# Patient Record
Sex: FEMALE | Race: BLACK OR AFRICAN AMERICAN | Employment: UNEMPLOYED | URBAN - METROPOLITAN AREA
[De-identification: names, ages, dates, MRNs, and addresses within clinical notes are randomized per-mention and may not be internally consistent; named-entity substitution may affect disease eponyms.]

---

## 2020-05-15 ENCOUNTER — HOSPITAL ENCOUNTER (EMERGENCY)
Facility: HOSPITAL | Age: 45
Discharge: HOME | End: 2020-05-15
Attending: EMERGENCY MEDICINE
Payer: MEDICAID

## 2020-05-15 VITALS
RESPIRATION RATE: 24 BRPM | WEIGHT: 265 LBS | OXYGEN SATURATION: 97 % | DIASTOLIC BLOOD PRESSURE: 75 MMHG | HEART RATE: 97 BPM | TEMPERATURE: 100 F | SYSTOLIC BLOOD PRESSURE: 143 MMHG

## 2020-05-15 DIAGNOSIS — U07.1 COVID-19 VIRUS INFECTION: Primary | ICD-10-CM

## 2020-05-15 PROCEDURE — 99282 EMERGENCY DEPT VISIT SF MDM: CPT

## 2020-05-15 RX ORDER — IBUPROFEN 600 MG/1
600 TABLET ORAL ONCE
Status: COMPLETED | OUTPATIENT
Start: 2020-05-15 | End: 2020-05-15

## 2020-05-16 NOTE — ED INITIAL ASSESSMENT (HPI)
Tested for covid yesterday. Called just now with positive result. Continues to have fever and headache. Cough yesterday. Tylenol last this morning.  No motrin

## 2020-05-16 NOTE — ED PROVIDER NOTES
Patient Seen in: [facility] AND [facility] Emergency Department    History   Patient presents with:  Fever      HPI    Patient presents to the ED complaining of ongoing fever, headache, cough and body aches after being diagnosed with COVID 19 yesterday.   Symptom Morbidly obese   HENT:   Head: Normocephalic and atraumatic. Cardiovascular: Normal rate, regular rhythm and intact distal pulses. Pulmonary/Chest: Effort normal and breath sounds normal. No respiratory distress. Abdominal: Soft.  She exhibits no di soon as possible for a visit in 3 days      [facility] Emergency Department  [facility] [address] [place] [address]  [phone]    If symptoms worsen      Medications Prescribed:  There are no discharge medications for this patient.

## 2020-05-20 ENCOUNTER — HOSPITAL ENCOUNTER (INPATIENT)
Facility: HOSPITAL | Age: 45
LOS: 3 days | Discharge: HOME | DRG: 177 | End: 2020-05-24
Attending: EMERGENCY MEDICINE | Admitting: INTERNAL MEDICINE
Payer: MEDICAID

## 2020-05-20 ENCOUNTER — APPOINTMENT (OUTPATIENT)
Dept: GENERAL RADIOLOGY | Facility: HOSPITAL | Age: 45
DRG: 177 | End: 2020-05-20
Attending: EMERGENCY MEDICINE
Payer: MEDICAID

## 2020-05-20 DIAGNOSIS — J12.82 PNEUMONIA DUE TO COVID-19 VIRUS: ICD-10-CM

## 2020-05-20 DIAGNOSIS — U07.1 PNEUMONIA DUE TO COVID-19 VIRUS: ICD-10-CM

## 2020-05-20 DIAGNOSIS — R06.89 RESPIRATORY INSUFFICIENCY: Primary | ICD-10-CM

## 2020-05-20 PROCEDURE — 71045 X-RAY EXAM CHEST 1 VIEW: CPT | Performed by: EMERGENCY MEDICINE

## 2020-05-21 PROBLEM — U07.1 PNEUMONIA DUE TO COVID-19 VIRUS: Status: ACTIVE | Noted: 2020-05-21

## 2020-05-21 PROBLEM — J12.82 PNEUMONIA DUE TO COVID-19 VIRUS: Status: ACTIVE | Noted: 2020-05-21

## 2020-05-21 PROBLEM — R06.89 RESPIRATORY INSUFFICIENCY: Status: ACTIVE | Noted: 2020-05-21

## 2020-05-21 RX ORDER — ACETAMINOPHEN 500 MG
1000 TABLET ORAL EVERY 4 HOURS PRN
Status: DISCONTINUED | OUTPATIENT
Start: 2020-05-21 | End: 2020-05-24

## 2020-05-21 RX ORDER — ENOXAPARIN SODIUM 100 MG/ML
40 INJECTION SUBCUTANEOUS DAILY
Status: DISCONTINUED | OUTPATIENT
Start: 2020-05-21 | End: 2020-05-21

## 2020-05-21 RX ORDER — SODIUM CHLORIDE 9 MG/ML
INJECTION, SOLUTION INTRAVENOUS ONCE
Status: DISCONTINUED | OUTPATIENT
Start: 2020-05-21 | End: 2020-05-24

## 2020-05-21 RX ORDER — ENOXAPARIN SODIUM 100 MG/ML
30 INJECTION SUBCUTANEOUS EVERY 12 HOURS SCHEDULED
Status: DISCONTINUED | OUTPATIENT
Start: 2020-05-21 | End: 2020-05-24

## 2020-05-21 RX ORDER — ACETAMINOPHEN 500 MG
500 TABLET ORAL EVERY 4 HOURS PRN
Status: DISCONTINUED | OUTPATIENT
Start: 2020-05-21 | End: 2020-05-24

## 2020-05-21 RX ORDER — ONDANSETRON 2 MG/ML
4 INJECTION INTRAMUSCULAR; INTRAVENOUS EVERY 6 HOURS PRN
Status: DISCONTINUED | OUTPATIENT
Start: 2020-05-21 | End: 2020-05-24

## 2020-05-21 NOTE — CM/SW NOTE
Pt's insurance OON - [employer].   Pt being admitted INPT for:   Respiratory insufficiency    Pneumonia due to COVID-19 virus

## 2020-05-21 NOTE — PLAN OF CARE
Problem: Patient Centered Care  Goal: Patient preferences are identified and integrated in the patient's plan of care  Description  Interventions:  - What would you like us to know as we care for you?  From home  - Provide timely, complete, and accurate i SAFETY ADULT - FALL  Goal: Free from fall injury  Description  INTERVENTIONS:  - Assess pt frequently for physical needs  - Identify cognitive and physical deficits and behaviors that affect risk of falls.   - [place] fall precautions as indicated by asse

## 2020-05-21 NOTE — CONSULTS
Pulmonary Consult     Assessment / Plan:  1. Acute hypoxic respiratory failure - due to COVID  - wean supplemental O2 as able  - self prone  2.  COVID-19 pneumonia - dx 5/14; rapid here was negative  - hold on starting plaquenil given unproven efficacy and imaging reviewed

## 2020-05-21 NOTE — PLAN OF CARE
Problem: Patient Centered Care  Goal: Patient preferences are identified and integrated in the patient's plan of care  Description  Interventions:  - What would you like us to know as we care for you?   - Provide timely, complete, and accurate informatio Problem: SKIN/TISSUE INTEGRITY - ADULT  Goal: Skin integrity remains intact  Description  INTERVENTIONS  - Assess and document risk factors for pressure ulcer development  - Assess and document skin integrity  - Monitor for areas of redness and/or skin b

## 2020-05-21 NOTE — ED NOTES
Pt a&o x4, independent. Pt w/exertional dyspnea. Dx COVID x5 days ago. Worsening sob. NAD. 2L NC d/t O2 desat w/activity.  Call [facility] @[phone] w/any questions

## 2020-05-21 NOTE — ED PROVIDER NOTES
Patient Seen in: [facility] AND [facility] Emergency Department    History   Patient presents with:  Dyspnea XU SOB    Stated Complaint:  Cough/dyspnea    HPI    81-year-old female with past medical history of asthma presenting for evaluation of approximately 24 injection. Neck: No meningismus. Cardiovascular: RRR. Pulmonary/Chest: Tachypneic; exertionally dyspneic with bibasilar rhonchi. Abdominal: Soft. Nontender. Musculoskeletal: No gross deformity. Neurological: Alert. Skin: Skin is warm.    Psychiatr DATE OF EXAM: 05/20/2020  Patient No:  [id number]  Physician:  Karla Leyden  YOB: 1975    Past Medical History (entered by Technologist):  History of asthma. Reason For Exam (entered by Technologist):   Worsening dyspnea and cough rafaela White in agreement with plan of care.     I was wearing at minimum a facemask and eye protection throughout this encounter with handwashing performed prior and after patient evaluation without personal hand/facial/oropharyngeal contact and maxx

## 2020-05-21 NOTE — H&P
[facility]    History and Physical    Jules Concepcion Patient Status:  Inpatient    1975 MRN [id number]   Location [facility] Attending Awa Alvarez MD   Hosp Day # 0 PCP Kate Gonzalez MD     Date:  2020  Date Hematological: Negative. Psychiatric/Behavioral: Negative. Negative for behavioral problems, confusion and agitation. Physical Exam:   Vital Signs:  Blood pressure 125/57, pulse 101, temperature 99.6 °F (37.6 °C), temperature source Oral, resp.

## 2020-05-21 NOTE — PROGRESS NOTES
[facility]:  Anticoagulation Weight Dose Adjustment for enoxaparin (LOVENOX)    Vincent Fernando is a 45year old female who has been prescribed enoxaparin (LOVENOX) 40 mg every 24 hours.       Estimated Creatinine Clearance: 62.7 mL/min (based on SCr

## 2020-05-21 NOTE — PAYOR COMM NOTE
--------------  ADMISSION REVIEW     Payor: Tom Free #:  [id number]  Authorization Number: [id number]    Admit date: 5/21/20  Admit time: 1924       Admitting Physician: Abril Wiley MD  Attending Physician:  Abril Wiley MD  Primary Care P abdominal pain. Positive for stated complaint:   Other systems are as noted in HPI. Constitutional and vital signs reviewed. All other systems reviewed and negative except as noted above.     PSFH elements reviewed from today and agreed except as Abnormality         Status                     ---------                               -----------         ------                     CBC W/ DIFFERENTIAL[[id number]]          Abnormal            Final result                 Please vi Last 1 Encounters:  05/20/20 : 100  , sinus,      Evaluation generalized myalgias/fatigue and malaise over past 5 days now complicated by constant shortness of breath and obese patient with known history of asthma; patient with profound conversational dysp Subcutaneous (Left Lower Abdomen) Wendi Clark RN

## 2020-05-21 NOTE — CONSULTS
[facility] - [facility] ID CONSULT NOTE    Aracelis Li Patient Status:  Inpatient    1975 MRN [id number]   Location [facility] Attending Carlos Trevino MD   Hosp Day # 0 PCP Bianca Baer MD       Reason for Consultat nausea, vomiting or diarrhea. No abdominal pain or blood. GENITOURINARY:  No Burning on urination. NEUROLOGICAL:  No headache, dizziness, syncope, paralysis, ataxia, numbness or tingling in the extremities.   MUSCULOSKELETAL:  No muscle, back pain, joint n/a    # Asthma    PLAN:  -  Currently on azithromycin - discontinue  -  Consented for CCP - agreeable. Ordered. -  Monitor respiratory status closely. -  Evaluate for need for actemra/steroids.  -  Follow fever curve, wbc.   -  Reviewed labs, micro, imag

## 2020-05-22 PROCEDURE — 30233R1 TRANSFUSION OF NONAUTOLOGOUS PLATELETS INTO PERIPHERAL VEIN, PERCUTANEOUS APPROACH: ICD-10-PCS | Performed by: INTERNAL MEDICINE

## 2020-05-22 NOTE — PROGRESS NOTES
[facility]    Progress Note    Aracelis Li Patient Status:  Inpatient    1975 MRN [id number]   Location [facility]5W Attending Carlos Trevino MD   Hosp Day # 1 PCP Bianca Baer MD        Subjective:     Constitut 05/22/2020     05/22/2020    K 3.9 05/22/2020     05/22/2020    CO2 25.0 05/22/2020     (H) 05/22/2020    CA 8.7 05/22/2020    ALB 3.3 (L) 05/21/2020    ALKPHO 62 05/21/2020    BILT 0.4 05/21/2020    TP 7.5 05/21/2020    AST 23 05/21/202

## 2020-05-22 NOTE — PAYOR COMM NOTE
--------------  CONTINUED STAY REVIEW   5-22-20        Payor: Galdino Nguyen #:  [id number]  Authorization Number: [id number]    Admit date: 5/21/20  Admit time: 5546    Admitting Physician: Rhonda Wood MD  Attending Physician:  Rhonda Wood MD Potassium  3.5 - 5.1 mmol/L 3.9    Chloride  98 - 112 mmol/L 105    CO2  21.0 - 32.0 mmol/L 25.0    Anion Gap  0 - 18 mmol/L 7    BUN  7 - 18 mg/dL 12    Creatinine  0.55 - 1.02 mg/dL 1. 09High       PER ID:   PLAN:  -  Currently on azithromycin - discont

## 2020-05-22 NOTE — PROGRESS NOTES
Pulmonary Progress Note     Assessment / Plan:  1. Acute hypoxic respiratory failure - due to COVID  - wean supplemental O2 as able; on 2 LPM  - self prone  2. COVID-19 pneumonia - dx 5/14; rapid here was negative  - CCP ordered.  Patient understands this i

## 2020-05-22 NOTE — DIETARY NOTE
ADULT NUTRITION INITIAL ASSESSMENT    Pt is at moderate nutrition risk. Pt does not meet malnutrition criteria.       RECOMMENDATIONS TO MD:  See Nutrition Intervention     NUTRITION DIAGNOSIS/PROBLEM:  Inadequate protein energy intake related to decreased Readings from Last 10 Encounters:  05/20/20 : 120 kg (264 lb 8.8 oz)  05/15/20 : 120.2 kg (265 lb)    GASTROINTESTINAL: +BM 5/21 and diarrhea    FOOD/NUTRITION RELATED HISTORY:  Appetite: Fair  Intake: 50-80%  Intake Meeting Needs: No, but supplements to m

## 2020-05-22 NOTE — PLAN OF CARE
Pt alert x4, on 2L oxygen, up in chair by self, appetite improved. Pt received  Convalesant plasma 1 unit given to pt this afternoon, tolerated well. Will continue to monitor. Slight temp this afternoon, MD aware.        Problem: Patient Centered Care  Goal and instruct to report SOB or any respiratory difficulty  - Respiratory Therapy support as indicated  - Manage/alleviate anxiety  - Monitor for signs/symptoms of CO2 retention  Outcome: Progressing     Problem: SAFETY ADULT - FALL  Goal: Free from fall inj

## 2020-05-23 PROCEDURE — 99232 SBSQ HOSP IP/OBS MODERATE 35: CPT | Performed by: INTERNAL MEDICINE

## 2020-05-23 NOTE — PROGRESS NOTES
[facility] PULMONARY/CRITICAL CARE    S: Pt is doing ok at this time. + cough.  Not too much dyspnea     Meds:  • enoxaparin  30 mg Subcutaneous 2 times per day   • sodium chloride   Intravenous Once       Prn Meds:  acetaminophen **OR** acetaminophen, ondansetron

## 2020-05-23 NOTE — PROGRESS NOTES
INFECTIOUS DISEASE PROGRESS NOTE    Michelle Stokes Patient Status:  Inpatient    1975 MRN [id number]   Location [facility]W Attending Rene Mckeon MD   Hosp Day # 1 PCP Lencho Norman MD     Subjective:  ROS done.  Patient not seen o and volume: 5/22 210 mL               - O2 requirement at CCP date: 2 L/min               - Type and Screen: A+               - Remdesivir start date: n/a               - Actemra date: n/a               - steroids start date: n/a    # Asthma     PLAN:    -

## 2020-05-23 NOTE — PLAN OF CARE
Problem: Patient Centered Care  Goal: Patient preferences are identified and integrated in the patient's plan of care  Description  Interventions:  - What would you like us to know as we care for you?  From home  - Provide timely, complete, and accurate i SAFETY ADULT - FALL  Goal: Free from fall injury  Description  INTERVENTIONS:  - Assess pt frequently for physical needs  - Identify cognitive and physical deficits and behaviors that affect risk of falls.   - [place] fall precautions as indicated by asse

## 2020-05-24 VITALS
WEIGHT: 264.56 LBS | RESPIRATION RATE: 18 BRPM | SYSTOLIC BLOOD PRESSURE: 104 MMHG | HEIGHT: 64 IN | TEMPERATURE: 98 F | HEART RATE: 86 BPM | DIASTOLIC BLOOD PRESSURE: 63 MMHG | BODY MASS INDEX: 45.17 KG/M2 | OXYGEN SATURATION: 95 %

## 2020-05-24 PROCEDURE — 99239 HOSP IP/OBS DSCHRG MGMT >30: CPT | Performed by: INTERNAL MEDICINE

## 2020-05-24 NOTE — PROGRESS NOTES
[facility]    Progress Note    Yury Meza Patient Status:  Inpatient    1975 MRN [id number]   Location [facility]W Attending Destiney Manning MD   Hosp Day # 2 PCP Jacklyn Wise MD        Subjective:   Subjective: 17.00 (H) 05/23/2020                        Rochelle Mccoy MD  5/23/2020

## 2020-05-24 NOTE — PLAN OF CARE
Problem: Patient Centered Care  Goal: Patient preferences are identified and integrated in the patient's plan of care  Description  Interventions:  - What would you like us to know as we care for you?  From home  - Provide timely, complete, and accurate i SAFETY ADULT - FALL  Goal: Free from fall injury  Description  INTERVENTIONS:  - Assess pt frequently for physical needs  - Identify cognitive and physical deficits and behaviors that affect risk of falls.   - [place] fall precautions as indicated by asse

## 2020-05-24 NOTE — PLAN OF CARE
Patient O2 weaned. 93% at rest, 92% while ambulating. No complaints of dyspnea or SOB. Patient stable. No home O2 requirements needed at this time. Plan for DC today.

## 2020-05-24 NOTE — PLAN OF CARE
Problem: Patient Centered Care  Goal: Patient preferences are identified and integrated in the patient's plan of care  Description  Interventions:  - Provide timely, complete, and accurate information to patient/family  - Incorporate patient and family k injury  Description  INTERVENTIONS:  - Assess pt frequently for physical needs  - Identify cognitive and physical deficits and behaviors that affect risk of falls.   - [place] fall precautions as indicated by assessment.  - Educate pt/family on patient sa

## 2020-05-24 NOTE — PLAN OF CARE
Patient prepared for dc home. Saline lock removed. Patient educated on when to follow up with MD and to return if symptoms worsen. Patient expressed a verbal understanding of all dc instructions.

## 2020-05-24 NOTE — DISCHARGE SUMMARY
[facility]    Discharge Summary    Daisha Setting Patient Status:  Inpatient    1975 MRN [id number]   Location [facility]W Attending Rosalind Dumont MD   Hosp Day # 3 PCP Zaida Hernandez MD     Date of Admission: 20    Plan  DC home    Complications: None    Consultants     Provider Role Specialty    Rosie Fontana MD Consulting Physician  INFECTIOUS DISEASES    Padmini Pittman MD Consulting Physician  PULMONARY DISEASES    Boyd Garces MD Consulting Phys

## 2020-05-24 NOTE — PROGRESS NOTES
[facility] PULMONARY/CRITICAL CARE    S: Pt is feeling ok. Denies shortness of breath. + Cough, + chest pain at times.     Meds:  • enoxaparin  30 mg Subcutaneous 2 times per day   • sodium chloride   Intravenous Once       Prn Meds:  acetaminophen **OR** acetamin lovenox ppx  2. PPx  - lovenox  3. Dispo   - ok for home today from a pulmonary standpoint; if needed we can arrange home O2  - pt can f/u via telehealth visit in 1 week. Epifanio Delgado M.D.   Pulmonary/Critical Care

## 2020-05-26 ENCOUNTER — PATIENT OUTREACH (OUTPATIENT)
Dept: CASE MANAGEMENT | Age: 45
End: 2020-05-26

## 2020-05-26 NOTE — PROGRESS NOTES
1st attempt PCP apt request    . [address] [place] [address]  [address] [facility]. [phone]

## 2020-06-03 NOTE — PAYOR COMM NOTE
--------------  DISCHARGE REVIEW    Payor: Zahira Archuleta #:  [id number]  Authorization Number: [id number]    Admit date: 5/21/20  Admit time:  0797  Discharge Date: 5/24/2020  1:17 PM     Admitting Physician: Guzman Valencia MD  Attending Physician: oriented x 3. HEENT: Moist mucous membranes. EOM-I  Neck: Supple No JVD. No carotid bruits. Respiratory: Clear to auscultation bilaterally. No wheezes. No rhonchi. Cardiovascular: S1, S2.  Regular rate and rhythm. No murmurs.  Equal pulses   Abdomen: S

## 2021-12-29 ENCOUNTER — HOSPITAL ENCOUNTER (EMERGENCY)
Facility: HOSPITAL | Age: 46
Discharge: HOME | End: 2021-12-29
Attending: EMERGENCY MEDICINE
Payer: MEDICAID

## 2021-12-29 VITALS
HEART RATE: 92 BPM | DIASTOLIC BLOOD PRESSURE: 91 MMHG | BODY MASS INDEX: 44.39 KG/M2 | SYSTOLIC BLOOD PRESSURE: 144 MMHG | HEIGHT: 64 IN | TEMPERATURE: 99 F | OXYGEN SATURATION: 98 % | WEIGHT: 260 LBS | RESPIRATION RATE: 16 BRPM

## 2021-12-29 DIAGNOSIS — R11.0 NAUSEA: ICD-10-CM

## 2021-12-29 DIAGNOSIS — Z20.822 SUSPECTED 2019 NOVEL CORONAVIRUS INFECTION: Primary | ICD-10-CM

## 2021-12-29 LAB
ALBUMIN SERPL-MCNC: 4 G/DL (ref 3.4–5)
ALBUMIN/GLOB SERPL: 1 {RATIO} (ref 1–2)
ALP LIVER SERPL-CCNC: 85 U/L
ALT SERPL-CCNC: 31 U/L
ANION GAP SERPL CALC-SCNC: 6 MMOL/L (ref 0–18)
AST SERPL-CCNC: 12 U/L (ref 15–37)
B-HCG UR QL: NEGATIVE
BASOPHILS # BLD AUTO: 0.01 X10(3) UL (ref 0–0.2)
BASOPHILS NFR BLD AUTO: 0.1 %
BILIRUB SERPL-MCNC: 0.4 MG/DL (ref 0.1–2)
BILIRUB UR QL: NEGATIVE
BUN BLD-MCNC: 16 MG/DL (ref 7–18)
BUN/CREAT SERPL: 13.7 (ref 10–20)
CALCIUM BLD-MCNC: 9.1 MG/DL (ref 8.5–10.1)
CHLORIDE SERPL-SCNC: 107 MMOL/L (ref 98–112)
CO2 SERPL-SCNC: 28 MMOL/L (ref 21–32)
COLOR UR: YELLOW
CREAT BLD-MCNC: 1.17 MG/DL
DEPRECATED RDW RBC AUTO: 47.6 FL (ref 35.1–46.3)
EOSINOPHIL # BLD AUTO: 0.04 X10(3) UL (ref 0–0.7)
EOSINOPHIL NFR BLD AUTO: 0.5 %
ERYTHROCYTE [DISTWIDTH] IN BLOOD BY AUTOMATED COUNT: 14.5 % (ref 11–15)
GLOBULIN PLAS-MCNC: 4.2 G/DL (ref 2.8–4.4)
GLUCOSE BLD-MCNC: 107 MG/DL (ref 70–99)
GLUCOSE UR-MCNC: NEGATIVE MG/DL
HCT VFR BLD AUTO: 44 %
HGB BLD-MCNC: 14.1 G/DL
HGB UR QL STRIP.AUTO: NEGATIVE
IMM GRANULOCYTES # BLD AUTO: 0.02 X10(3) UL (ref 0–1)
IMM GRANULOCYTES NFR BLD: 0.2 %
KETONES UR-MCNC: NEGATIVE MG/DL
LEUKOCYTE ESTERASE UR QL STRIP.AUTO: NEGATIVE
LYMPHOCYTES # BLD AUTO: 0.56 X10(3) UL (ref 1–4)
LYMPHOCYTES NFR BLD AUTO: 6.4 %
MCH RBC QN AUTO: 28.8 PG (ref 26–34)
MCHC RBC AUTO-ENTMCNC: 32 G/DL (ref 31–37)
MCV RBC AUTO: 90 FL
MONOCYTES # BLD AUTO: 0.51 X10(3) UL (ref 0.1–1)
MONOCYTES NFR BLD AUTO: 5.8 %
NEUTROPHILS # BLD AUTO: 7.62 X10 (3) UL (ref 1.5–7.7)
NEUTROPHILS # BLD AUTO: 7.62 X10(3) UL (ref 1.5–7.7)
NEUTROPHILS NFR BLD AUTO: 87 %
NITRITE UR QL STRIP.AUTO: NEGATIVE
OSMOLALITY SERPL CALC.SUM OF ELEC: 294 MOSM/KG (ref 275–295)
PH UR: 8 [PH] (ref 5–8)
PLATELET # BLD AUTO: 327 10(3)UL (ref 150–450)
POTASSIUM SERPL-SCNC: 4.2 MMOL/L (ref 3.5–5.1)
PROT SERPL-MCNC: 8.2 G/DL (ref 6.4–8.2)
PROT UR-MCNC: 30 MG/DL
RBC # BLD AUTO: 4.89 X10(6)UL
SODIUM SERPL-SCNC: 141 MMOL/L (ref 136–145)
SP GR UR STRIP: 1.02 (ref 1–1.03)
UROBILINOGEN UR STRIP-ACNC: <2
WBC # BLD AUTO: 8.8 X10(3) UL (ref 4–11)

## 2021-12-29 PROCEDURE — 96361 HYDRATE IV INFUSION ADD-ON: CPT

## 2021-12-29 PROCEDURE — 81025 URINE PREGNANCY TEST: CPT

## 2021-12-29 PROCEDURE — 99284 EMERGENCY DEPT VISIT MOD MDM: CPT

## 2021-12-29 PROCEDURE — 81001 URINALYSIS AUTO W/SCOPE: CPT | Performed by: EMERGENCY MEDICINE

## 2021-12-29 PROCEDURE — 85025 COMPLETE CBC W/AUTO DIFF WBC: CPT | Performed by: EMERGENCY MEDICINE

## 2021-12-29 PROCEDURE — 80053 COMPREHEN METABOLIC PANEL: CPT | Performed by: EMERGENCY MEDICINE

## 2021-12-29 PROCEDURE — 96374 THER/PROPH/DIAG INJ IV PUSH: CPT

## 2021-12-29 RX ORDER — ONDANSETRON 4 MG/1
4 TABLET, ORALLY DISINTEGRATING ORAL EVERY 4 HOURS PRN
Qty: 10 TABLET | Refills: 0 | Status: SHIPPED | OUTPATIENT
Start: 2021-12-29 | End: 2022-01-05

## 2021-12-29 RX ORDER — ONDANSETRON 2 MG/ML
4 INJECTION INTRAMUSCULAR; INTRAVENOUS ONCE
Status: COMPLETED | OUTPATIENT
Start: 2021-12-29 | End: 2021-12-29

## 2021-12-29 NOTE — ED PROVIDER NOTES
Patient Seen in: [facility] AND [facility] Emergency Department      History   Patient presents with:  Covid    Stated Complaint: asthma/diarrhea    Subjective:   HPI    53-year-old female with history of asthma here with complaints of abdominal pain, nausea vom 15   Ht 162.6 cm (5' 4\")   Wt 117.9 kg   LMP 11/25/2021 (Approximate)   SpO2 100%   BMI 44.63 kg/m²         Physical Exam  Vitals and nursing note reviewed. HENT:      Head: Normocephalic.       Mouth/Throat:      Mouth: Mucous membranes are moist.   Eye Glucose Urine Negative Negative mg/dL    Bilirubin Urine Negative Negative    Ketones Urine Negative Negative mg/dL    Blood Urine Negative Negative    pH Urine 8.0 5.0 - 8.0    Protein Urine 30  (A) Negative mg/dL    Urobilinogen Urine <2.0 <2.0    Nitrit urine sample, electrolytes reassuring  - fluids, zofran ordered  - nausea improved - supportive care discussed    Medical Record Review: I personally reviewed available prior medical records for any recent pertinent discharge summaries, testing, and proced

## 2021-12-29 NOTE — ED INITIAL ASSESSMENT (HPI)
Pt reports having left sided abdominal pain, nausea, vomiting, and diarrhea since 2100 last night. She is also reporting shortness of breath.

## 2022-01-01 LAB — SARS-COV-2 RNA RESP QL NAA+PROBE: NOT DETECTED

## 2022-07-27 ENCOUNTER — APPOINTMENT (OUTPATIENT)
Dept: GENERAL RADIOLOGY | Facility: HOSPITAL | Age: 47
End: 2022-07-27

## 2022-07-27 ENCOUNTER — HOSPITAL ENCOUNTER (EMERGENCY)
Facility: HOSPITAL | Age: 47
Discharge: HOME | End: 2022-07-27

## 2022-07-27 VITALS
HEART RATE: 89 BPM | WEIGHT: 255 LBS | BODY MASS INDEX: 43.54 KG/M2 | SYSTOLIC BLOOD PRESSURE: 130 MMHG | HEIGHT: 64 IN | DIASTOLIC BLOOD PRESSURE: 70 MMHG | OXYGEN SATURATION: 94 % | RESPIRATION RATE: 18 BRPM | TEMPERATURE: 100 F

## 2022-07-27 DIAGNOSIS — U07.1 COVID-19: Primary | ICD-10-CM

## 2022-07-27 DIAGNOSIS — J02.0 STREP PHARYNGITIS: ICD-10-CM

## 2022-07-27 LAB
B-HCG UR QL: NEGATIVE
BILIRUB UR QL: NEGATIVE
CLARITY UR: CLEAR
COLOR UR: YELLOW
GLUCOSE UR-MCNC: NEGATIVE MG/DL
HGB UR QL STRIP.AUTO: NEGATIVE
KETONES UR-MCNC: NEGATIVE MG/DL
LEUKOCYTE ESTERASE UR QL STRIP.AUTO: NEGATIVE
NITRITE UR QL STRIP.AUTO: NEGATIVE
PH UR: 6 [PH] (ref 5–8)
PROT UR-MCNC: NEGATIVE MG/DL
S PYO AG THROAT QL: POSITIVE
SARS-COV-2 RNA RESP QL NAA+PROBE: DETECTED
SP GR UR STRIP: 1.01 (ref 1–1.03)
UROBILINOGEN UR STRIP-ACNC: <2
VIT C UR-MCNC: NEGATIVE MG/DL

## 2022-07-27 PROCEDURE — 87880 STREP A ASSAY W/OPTIC: CPT

## 2022-07-27 PROCEDURE — 81025 URINE PREGNANCY TEST: CPT

## 2022-07-27 PROCEDURE — 71045 X-RAY EXAM CHEST 1 VIEW: CPT

## 2022-07-27 PROCEDURE — 96372 THER/PROPH/DIAG INJ SC/IM: CPT

## 2022-07-27 PROCEDURE — 99284 EMERGENCY DEPT VISIT MOD MDM: CPT

## 2022-07-27 PROCEDURE — 81003 URINALYSIS AUTO W/O SCOPE: CPT | Performed by: EMERGENCY MEDICINE

## 2022-07-27 RX ORDER — AMOXICILLIN 875 MG/1
875 TABLET, COATED ORAL ONCE
Status: COMPLETED | OUTPATIENT
Start: 2022-07-27 | End: 2022-07-27

## 2022-07-27 RX ORDER — AMOXICILLIN 875 MG/1
875 TABLET, COATED ORAL 2 TIMES DAILY
Qty: 20 TABLET | Refills: 0 | Status: SHIPPED | OUTPATIENT
Start: 2022-07-27 | End: 2022-08-06

## 2022-07-27 RX ORDER — KETOROLAC TROMETHAMINE 10 MG/1
10 TABLET, FILM COATED ORAL EVERY 6 HOURS PRN
Qty: 20 TABLET | Refills: 0 | Status: SHIPPED | OUTPATIENT
Start: 2022-07-27 | End: 2022-08-01

## 2022-07-27 RX ORDER — KETOROLAC TROMETHAMINE 30 MG/ML
30 INJECTION, SOLUTION INTRAMUSCULAR; INTRAVENOUS ONCE
Status: COMPLETED | OUTPATIENT
Start: 2022-07-27 | End: 2022-07-27

## 2022-07-27 RX ORDER — ACETAMINOPHEN 500 MG
1000 TABLET ORAL ONCE
Status: COMPLETED | OUTPATIENT
Start: 2022-07-27 | End: 2022-07-27

## 2022-07-27 NOTE — ED INITIAL ASSESSMENT (HPI)
Pt to ED with c/o body aches, chills, and cough for a few days. Pt states negative COVID yesterday. No respiratory distress noted. Pt is alert and oriented x4. Pt denies dysuria or hematuria.

## 2022-07-28 NOTE — ED QUICK NOTES
Patient safe to DC home per MD. Bryan Her to dress self. DC teaching done, instructions reviewed with patient including when and how to follow up with healthcare providers and when to seek emergency care. The patient verbalizes understanding. Patient ambulatory with steady gait to exit.

## 2025-03-18 ENCOUNTER — HOSPITAL ENCOUNTER (EMERGENCY)
Facility: HOSPITAL | Age: 50
Discharge: HOME | End: 2025-03-18
Attending: EMERGENCY MEDICINE

## 2025-03-18 VITALS
DIASTOLIC BLOOD PRESSURE: 87 MMHG | TEMPERATURE: 97 F | SYSTOLIC BLOOD PRESSURE: 132 MMHG | HEART RATE: 97 BPM | RESPIRATION RATE: 20 BRPM | OXYGEN SATURATION: 96 %

## 2025-03-18 DIAGNOSIS — J06.9 VIRAL UPPER RESPIRATORY TRACT INFECTION: Primary | ICD-10-CM

## 2025-03-18 LAB
FLUAV + FLUBV RNA SPEC NAA+PROBE: NEGATIVE
FLUAV + FLUBV RNA SPEC NAA+PROBE: NEGATIVE
RSV RNA SPEC NAA+PROBE: NEGATIVE
SARS-COV-2 RNA RESP QL NAA+PROBE: NOT DETECTED

## 2025-03-18 PROCEDURE — 99283 EMERGENCY DEPT VISIT LOW MDM: CPT

## 2025-03-18 PROCEDURE — 0241U SARS-COV-2/FLU A AND B/RSV BY PCR (GENEXPERT): CPT

## 2025-03-18 PROCEDURE — 0241U SARS-COV-2/FLU A AND B/RSV BY PCR (GENEXPERT): CPT | Performed by: EMERGENCY MEDICINE

## 2025-03-18 RX ORDER — AZITHROMYCIN 250 MG/1
TABLET, FILM COATED ORAL
Qty: 6 TABLET | Refills: 0 | Status: SHIPPED | OUTPATIENT
Start: 2025-03-18 | End: 2025-03-23

## 2025-03-18 RX ORDER — PREDNISONE 20 MG/1
40 TABLET ORAL DAILY
Qty: 10 TABLET | Refills: 0 | Status: SHIPPED | OUTPATIENT
Start: 2025-03-18 | End: 2025-03-23

## 2025-03-18 RX ORDER — BENZONATATE 100 MG/1
100 CAPSULE ORAL 3 TIMES DAILY PRN
Qty: 30 CAPSULE | Refills: 0 | Status: SHIPPED | OUTPATIENT
Start: 2025-03-18 | End: 2025-04-17

## 2025-03-18 NOTE — ED PROVIDER NOTES
Patient Seen in: [facility] Emergency Department    History     Chief Complaint   Patient presents with    Upper Respiratory Infection       HPI    Patient presents to the ED complaining of generalized bodyaches, cough, congestion and feeling fatigued for the past few days.  Denies other complaints.    History reviewed.   Past Medical History:    Asthma (HCC)       History reviewed. History reviewed. No pertinent surgical history.      Medications :  Prescriptions Prior to Admission[1]     No family history on file.    Smoking Status:   Social History     Socioeconomic History    Marital status:    Tobacco Use    Smoking status: Never    Smokeless tobacco: Never   Vaping Use    Vaping status: Never Used   Substance and Sexual Activity    Alcohol use: Not Currently    Drug use: Never       Constitutional and vital signs reviewed.      Social History and Family History elements reviewed from today, pertinent positives to the presenting problem noted.    Physical Exam     ED Triage Vitals [03/18/25 1134]   /87   Pulse 97   Resp 20   Temp 96.8 °F (36 °C)   Temp src Temporal   SpO2 96 %   O2 Device None (Room air)       All measures to prevent infection transmission during my interaction with the patient were taken. Handwashing was performed prior to and after the exam.  Stethoscope and any equipment used during my examination was cleaned with super sani-cloth germicidal wipes following the exam.     Physical Exam  Vitals and nursing note reviewed.   Constitutional:       General: She is not in acute distress.     Appearance: She is well-developed. She is obese. She is not ill-appearing or toxic-appearing.   HENT:      Head: Normocephalic and atraumatic.   Eyes:      General:         Right eye: No discharge.         Left eye: No discharge.      Conjunctiva/sclera: Conjunctivae normal.   Neck:      Trachea: No tracheal deviation.   Cardiovascular:      Rate and Rhythm: Normal rate and regular rhythm.       Heart sounds: Normal heart sounds.   Pulmonary:      Effort: Pulmonary effort is normal. No respiratory distress.      Breath sounds: Normal breath sounds. No stridor.   Abdominal:      General: There is no distension.      Palpations: Abdomen is soft.   Musculoskeletal:         General: No deformity.   Skin:     General: Skin is warm and dry.   Neurological:      Mental Status: She is alert and oriented to person, place, and time.   Psychiatric:         Mood and Affect: Mood normal.         Behavior: Behavior normal.         ED Course        Labs Reviewed   SARS-COV-2/FLU A AND B/RSV BY PCR (GENEXPERT) - Normal    Narrative:     This test is intended for the qualitative detection and differentiation of SARS-CoV-2, influenza A, influenza B, and respiratory syncytial virus (RSV) viral RNA in nasopharyngeal or nares swabs from individuals suspected of respiratory viral infection consistent with COVID-19 by their healthcare provider. Signs and symptoms of respiratory viral infection due to SARS-CoV-2, influenza, and RSV can be similar.                                    Test performed using the Xpert Xpress SARS-CoV-2/FLU/RSV (real time RT-PCR)  assay on the GeneXpert instrument, [employer], [place] [address].                   This test is being used under the Food and Drug Administration's Emergency Use Authorization.                                    The authorized Fact Sheet for Healthcare Providers for this assay is available upon request from the laboratory.       As Interpreted by me    Imaging Results Available and Reviewed while in ED: No results found.  ED Medications Administered: Medications - No data to display      MDM     Vitals:    03/18/25 1134   BP: 132/87   Pulse: 97   Resp: 20   Temp: 96.8 °F (36 °C)   TempSrc: Temporal   SpO2: 96%     *I personally reviewed and interpreted all ED vitals.    Pulse Ox: 96%, Room air, Normal     Differential Diagnosis/ Diagnostic Considerations: Viral URI,  other    Complicating Factors: The patient already has does not have any pertinent problems on file. to contribute to the complexity of this ED evaluation.    Medical Decision Making  Patient presents to the ED with viral URI symptoms.  Nondistressed on exam.  Lungs clear and vital signs normal.  Viral testing negative.  Patient reassured and stable for discharge with supportive care and outpatient follow-up.  Patient adamant on oral antibiotics.  Will prescribe Z-Thao at her insistence.    Problems Addressed:  Viral upper respiratory tract infection: acute illness or injury    Amount and/or Complexity of Data Reviewed  Labs: ordered. Decision-making details documented in ED Course.    Risk  Prescription drug management.        Condition upon leaving the department: Stable    Disposition and Plan     Clinical Impression:  1. Viral upper respiratory tract infection        Disposition:  Discharge    Follow-up:  Meir Hui MD  [facility] [address]  [phone]    Schedule an appointment as soon as possible for a visit in 3 day(s)        Medications Prescribed:  Discharge Medication List as of 3/18/2025 12:51 PM        START taking these medications    Details   predniSONE 20 MG Oral Tab Take 2 tablets (40 mg total) by mouth daily for 5 days., Normal, Disp-10 tablet, R-0      benzonatate 100 MG Oral Cap Take 1 capsule (100 mg total) by mouth 3 (three) times daily as needed for cough., Normal, Disp-30 capsule, R-0      azithromycin (ZITHROMAX Z-THAO) 250 MG Oral Tab Take 2 tablets (500 mg total) by mouth daily for 1 day, THEN 1 tablet (250 mg total) daily for 4 days., Normal, Disp-6 tablet, R-0                              [1] (Not in a hospital admission)

## (undated) NOTE — LETTER
Date & Time: 3/18/2025, 12:53 PM  Patient: Derrick Armas  Encounter Provider(s):    Lito Rodriguez MD       To Whom It May Concern:    Derrick Armas was seen and treated in our department on 3/18/2025. She should not return to work until 3/20/2025 .    If you have any questions or concerns, please do not hesitate to call.        _____________________________  Physician/APC Signature